# Patient Record
Sex: FEMALE | Race: OTHER | NOT HISPANIC OR LATINO | URBAN - METROPOLITAN AREA
[De-identification: names, ages, dates, MRNs, and addresses within clinical notes are randomized per-mention and may not be internally consistent; named-entity substitution may affect disease eponyms.]

---

## 2017-11-27 ENCOUNTER — EMERGENCY (EMERGENCY)
Facility: HOSPITAL | Age: 32
LOS: 1 days | Discharge: HOME | End: 2017-11-27
Attending: EMERGENCY MEDICINE
Payer: COMMERCIAL

## 2017-11-27 VITALS
OXYGEN SATURATION: 98 % | TEMPERATURE: 98 F | HEART RATE: 92 BPM | SYSTOLIC BLOOD PRESSURE: 116 MMHG | DIASTOLIC BLOOD PRESSURE: 78 MMHG | RESPIRATION RATE: 18 BRPM

## 2017-11-27 LAB
APPEARANCE UR: CLEAR — SIGNIFICANT CHANGE UP
BILIRUB UR-MCNC: NEGATIVE — SIGNIFICANT CHANGE UP
COLOR SPEC: YELLOW — SIGNIFICANT CHANGE UP
DIFF PNL FLD: ABNORMAL
GLUCOSE UR QL: NEGATIVE — SIGNIFICANT CHANGE UP
HCG UR QL: NEGATIVE — SIGNIFICANT CHANGE UP
KETONES UR-MCNC: NEGATIVE — SIGNIFICANT CHANGE UP
LEUKOCYTE ESTERASE UR-ACNC: NEGATIVE — SIGNIFICANT CHANGE UP
NITRITE UR-MCNC: NEGATIVE — SIGNIFICANT CHANGE UP
PH UR: 6 — SIGNIFICANT CHANGE UP (ref 5–8)
PROT UR-MCNC: 30 MG/DL
SP GR SPEC: 1.02 — SIGNIFICANT CHANGE UP (ref 1.01–1.02)
UROBILINOGEN FLD QL: NEGATIVE — SIGNIFICANT CHANGE UP

## 2017-11-27 PROCEDURE — 81025 URINE PREGNANCY TEST: CPT

## 2017-11-27 PROCEDURE — 99283 EMERGENCY DEPT VISIT LOW MDM: CPT | Mod: 25

## 2017-11-27 PROCEDURE — 93005 ELECTROCARDIOGRAM TRACING: CPT

## 2017-11-27 PROCEDURE — 82962 GLUCOSE BLOOD TEST: CPT

## 2017-11-27 PROCEDURE — 81001 URINALYSIS AUTO W/SCOPE: CPT

## 2017-11-27 PROCEDURE — 99283 EMERGENCY DEPT VISIT LOW MDM: CPT

## 2017-11-27 NOTE — ED ADULT NURSE NOTE - OBJECTIVE STATEMENT
Patient brought to ER via wheelchair with active vomiting. Patient states she always get like this before her period. Patient c/o of pain on pain scale 3/10 crampy stated she took an aspirin and feels alittle better. She says she usually takes tylenol but MD hasn't seen her yet.

## 2017-11-27 NOTE — ED PROVIDER NOTE - CARE PLAN
Principal Discharge DX:	Vasovagal near syncope  Instructions for follow-up, activity and diet:	Stay well-hydrated. Use Tylenol 650mg and/or Motrin 600mg every 6 hours as needed for pain.  Follow-up with your primary care doctor.  Return for any new/worsening symptoms including chest pain, shortness of breath, loss of consciousness, palpitations or any other concerns.

## 2017-11-27 NOTE — ED PROVIDER NOTE - OBJECTIVE STATEMENT
31 y/o F w/ no significant PMHx presents to ED c/o menstrual cramps and an episode of near-syncope x this morning. Pt reports she's been having menstrual cramps since her period started 2 days ago. Pt had sudden onset of weakness, diaphoresis and lightheadedness, while seeing her mom's blood being drawn this morning. Pt then had an episode of vomiting. LMP 11/1. Pt had aspiring PTA, says she feels better. Pt denies any other complaints. 31 y/o F w/ no significant PMHx presents to ED c/o episode of near-syncope x this morning. Pt reports sudden onset of weakness, diaphoresis and lightheadedness, while seeing her mother's blood being drawn (pt mother also ED pt today) this morning.  Pt then had an episode of vomiting. LMP 11/1. 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 Pt had aspirin PTA, says she feels better. No concerning cardiac FHx at young age,  no sudden death in family. No other complaints. NKDA. 33 y/o F w/ no significant PMHx presents to ED c/o episode of near-syncope x this morning. Pt reports sudden onset of weakness, diaphoresis and lightheadedness, while seeing her mother's blood being drawn (pt mother also ED pt today) this morning.  Pt then had an episode of vomiting. LMP 11/1. 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 Pt had aspirin since onset, says she feels better. No concerning cardiac FHx at young age,  no sudden death in family. No other complaints. NKDA.

## 2017-11-27 NOTE — ED PROVIDER NOTE - CHPI ED SYMPTOMS POS
weakness, diaphoresis, lightheadedness, menstrual cramps, vomiting/VOMITING VOMITING/weakness, diaphoresis, lightheadedness, vomiting

## 2017-11-27 NOTE — ED PROVIDER NOTE - PLAN OF CARE
Stay well-hydrated. Use Tylenol 650mg and/or Motrin 600mg every 6 hours as needed for pain.  Follow-up with your primary care doctor.  Return for any new/worsening symptoms including chest pain, shortness of breath, loss of consciousness, palpitations or any other concerns.

## 2017-11-27 NOTE — ED PROVIDER NOTE - PROGRESS NOTE DETAILS
incidental finding of proteinuria discussed with patient with understanding to follow-up with pmd.  results printed.      SUN Francois MD

## 2017-11-27 NOTE — ED ADULT NURSE NOTE - CHPI ED SYMPTOMS NEG
no burning urination/no diarrhea/no blood in stool/no abdominal distension/no dysuria/no hematuria/no fever/no chills

## 2017-11-27 NOTE — ED PROVIDER NOTE - MEDICAL DECISION MAKING DETAILS
Will do an EKG, UA, labs, and reassess. 31 y/o F pt, healthy s/p episode of near-syncope. Will obtain EKG, fingerstick,, Urine r/o pregnancy, and reassess.

## 2022-03-22 PROBLEM — Z00.00 ENCOUNTER FOR PREVENTIVE HEALTH EXAMINATION: Status: ACTIVE | Noted: 2022-03-22

## 2022-03-30 ENCOUNTER — APPOINTMENT (OUTPATIENT)
Dept: INTERNAL MEDICINE | Facility: CLINIC | Age: 37
End: 2022-03-30